# Patient Record
Sex: MALE | Race: WHITE | Employment: OTHER | URBAN - METROPOLITAN AREA
[De-identification: names, ages, dates, MRNs, and addresses within clinical notes are randomized per-mention and may not be internally consistent; named-entity substitution may affect disease eponyms.]

---

## 2017-05-12 PROBLEM — G80.2 SPASTIC HEMIPLEGIC CEREBRAL PALSY (HCC): Status: ACTIVE | Noted: 2017-05-12

## 2018-03-15 ENCOUNTER — HOSPITAL ENCOUNTER (OUTPATIENT)
Dept: OTHER | Age: 28
Discharge: HOME | End: 2018-03-15
Attending: INTERNAL MEDICINE | Admitting: INTERNAL MEDICINE

## 2018-03-15 LAB
A/G RATIO: 1.8 (ref 1.1–2.2)
ALBUMIN SERPL-MCNC: 4.9 G/DL (ref 3.4–5)
ALP BLD-CCNC: 67 U/L (ref 40–129)
ALT SERPL-CCNC: 59 U/L (ref 10–40)
ANION GAP SERPL CALCULATED.3IONS-SCNC: 17 MMOL/L (ref 3–16)
AST SERPL-CCNC: 34 U/L (ref 15–37)
BILIRUB SERPL-MCNC: 0.6 MG/DL (ref 0–1)
BUN BLDV-MCNC: 15 MG/DL (ref 7–20)
CALCIUM SERPL-MCNC: 9.2 MG/DL (ref 8.3–10.6)
CHLORIDE BLD-SCNC: 96 MMOL/L (ref 99–110)
CO2: 23 MMOL/L (ref 21–32)
CREAT SERPL-MCNC: 0.7 MG/DL (ref 0.9–1.3)
FERRITIN: 116.4 NG/ML (ref 30–400)
GFR AFRICAN AMERICAN: >60
GFR NON-AFRICAN AMERICAN: >60
GLOBULIN: 2.8 G/DL
GLUCOSE BLD-MCNC: 82 MG/DL (ref 70–99)
HCT VFR BLD CALC: 53.2 % (ref 40.5–52.5)
HEMOGLOBIN: 18.5 G/DL (ref 13.5–17.5)
IRON SATURATION: 33 % (ref 20–50)
IRON: 113 UG/DL (ref 59–158)
MCH RBC QN AUTO: 32.6 PG (ref 26–34)
MCHC RBC AUTO-ENTMCNC: 34.8 G/DL (ref 31–36)
MCV RBC AUTO: 93.9 FL (ref 80–100)
PDW BLD-RTO: 13 % (ref 12.4–15.4)
PLATELET # BLD: 157 K/UL (ref 135–450)
PMV BLD AUTO: 9.3 FL (ref 5–10.5)
POTASSIUM SERPL-SCNC: 3.8 MMOL/L (ref 3.5–5.1)
RBC # BLD: 5.67 M/UL (ref 4.2–5.9)
SODIUM BLD-SCNC: 136 MMOL/L (ref 136–145)
TOTAL IRON BINDING CAPACITY: 342 UG/DL (ref 260–445)
TOTAL PROTEIN: 7.7 G/DL (ref 6.4–8.2)
WBC # BLD: 7.7 K/UL (ref 4–11)

## 2018-12-07 ENCOUNTER — HOSPITAL ENCOUNTER (EMERGENCY)
Age: 28
Discharge: HOME | End: 2018-12-07
Payer: MEDICAID

## 2018-12-07 VITALS
SYSTOLIC BLOOD PRESSURE: 125 MMHG | OXYGEN SATURATION: 94 % | HEART RATE: 89 BPM | DIASTOLIC BLOOD PRESSURE: 88 MMHG | TEMPERATURE: 98.1 F | RESPIRATION RATE: 18 BRPM

## 2018-12-07 DIAGNOSIS — J06.9 UPPER RESPIRATORY TRACT INFECTION, UNSPECIFIED TYPE: Primary | ICD-10-CM

## 2018-12-07 LAB
RAPID INFLUENZA  B AGN: NEGATIVE
RAPID INFLUENZA A AGN: NEGATIVE
S PYO AG THROAT QL: NEGATIVE

## 2018-12-07 PROCEDURE — 87880 STREP A ASSAY W/OPTIC: CPT

## 2018-12-07 PROCEDURE — 87804 INFLUENZA ASSAY W/OPTIC: CPT

## 2018-12-07 PROCEDURE — 87081 CULTURE SCREEN ONLY: CPT

## 2018-12-07 PROCEDURE — 99283 EMERGENCY DEPT VISIT LOW MDM: CPT

## 2018-12-07 PROCEDURE — 6370000000 HC RX 637 (ALT 250 FOR IP): Performed by: NURSE PRACTITIONER

## 2018-12-07 RX ORDER — ACETAMINOPHEN 500 MG
1000 TABLET ORAL ONCE
Status: COMPLETED | OUTPATIENT
Start: 2018-12-07 | End: 2018-12-07

## 2018-12-07 RX ADMIN — ACETAMINOPHEN 1000 MG: 500 TABLET, FILM COATED ORAL at 10:03

## 2018-12-07 ASSESSMENT — ENCOUNTER SYMPTOMS
VOMITING: 0
SHORTNESS OF BREATH: 0
CHEST TIGHTNESS: 0
SORE THROAT: 1
ABDOMINAL PAIN: 0
RHINORRHEA: 1
DIARRHEA: 0
NAUSEA: 0

## 2018-12-07 ASSESSMENT — PAIN SCALES - GENERAL: PAINLEVEL_OUTOF10: 0

## 2018-12-07 NOTE — ED PROVIDER NOTES
[address] [facility]  eMERGENCY dEPARTMENT eNCOUnter        Pt Name: Veronica Rivera  MRN: [id number]  Stephanietrongfquan 1990  Date of evaluation: 12/7/2018  Provider: LESA Andersen - MANFRED  PCP: Nelia Vargas MD    This patient was not seen and evaluated by the attending physician No att. providers found. CHIEF COMPLAINT       Chief Complaint   Patient presents with    Illness     runny nose, cough, 1x diarrhea       HISTORY OF PRESENT ILLNESS   (Location/Symptom, Timing/Onset, Context/Setting, Quality, Duration, Modifying Factors, Severity)  Note limiting factors. Veronica Rivera is a 33 y.o. male with concern of runny nose, sore throat. Reports symptom onset this morning. Reports mild relief with throat lozenges. No mitigating or exacerbating factor. Denies any recent travel or sick exposure. Denies any headache, fever, lightheadedness, dizziness, visual disturbances. No chest pain or pressure. No neck or back pain. No shortness of breath, cough, or congestion. No abdominal pain, nausea, vomiting, diarrhea, constipation, or dysuria. No rash. Nursing Notes were all reviewed and agreed with or any disagreements were addressed  in the HPI. REVIEW OF SYSTEMS    (2-9 systems for level 4, 10 or more for level 5)     Review of Systems   Constitutional: Negative for activity change, chills and fever. HENT: Positive for rhinorrhea and sore throat. Respiratory: Negative for chest tightness and shortness of breath. Cardiovascular: Negative for chest pain. Gastrointestinal: Negative for abdominal pain, diarrhea, nausea and vomiting. Genitourinary: Negative for dysuria. All other systems reviewed and are negative. Positives and Pertinent negatives as per HPI. Except as noted abovein the ROS, all other systems were reviewed and negative.        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    Ventricular septal defect          SURGICAL HISTORY     Past Surgical History:   Procedure Laterality Date    JOINT REPLACEMENT           CURRENTMEDICATIONS       Discharge Medication List as of 12/7/2018 10:49 AM      CONTINUE these medications which have NOT CHANGED    Details   loratadine (CLARITIN) 10 MG tablet Take 1 tablet by mouth daily, Disp-30 tablet, R-5Normal      zonisamide (ZONEGRAN) 100 MG capsule Take 1 capsule by mouth 2 times daily, Disp-180 capsule, R-0Normal      lamoTRIgine (LAMICTAL) 25 MG tablet Take 1 tablet by mouth 2 times daily, Disp-180 tablet, R-0Normal      digoxin (LANOXIN) 125 MCG tablet Take 1 tablet by mouth daily, Disp-90 tablet, R-0Normal      fluticasone (FLONASE) 50 MCG/ACT nasal spray 1 spray by Nasal route daily, Disp-1 Bottle, R-3Normal      furosemide (LASIX) 20 MG tablet Take 1 tablet by mouth daily, Disp-90 tablet, R-0Normal      docusate sodium (COLACE) 100 MG capsule Take 1 capsule by mouth 2 times daily, Disp-60 capsule, R-2Normal      losartan (COZAAR) 25 MG tablet Take 1 tablet by mouth daily, Disp-30 tablet, R-3Normal      warfarin (COUMADIN) 7.5 MG tablet Take 7.5 mg by mouthHistorical Med               ALLERGIES     Klonopin [clonazepam]    FAMILYHISTORY     History reviewed. No pertinent family history.        SOCIAL HISTORY       Social History     Social History    Marital status: Single     Spouse name: N/A    Number of children: N/A    Years of education: N/A     Social History Main Topics    Smoking status: Never Smoker    Smokeless tobacco: Never Used    Alcohol use No    Drug use: No    Sexual activity: No     Other Topics Concern    None     Social History Narrative    None       SCREENINGS             PHYSICAL EXAM    (up to 7 for level 4, 8 or more for level 5)     ED as CT, Ultrasound and MRI are read by the radiologist. Plain radiographic images are visualized andpreliminarily interpreted by the  ED Provider with the below findings:        Interpretation [facility] Radiologist below, if available at the time of this note:    No orders to display     No results found. PROCEDURES   Unless otherwise noted below, none     Procedures    CRITICAL CARE TIME   N/A    CONSULTS:  None      EMERGENCY DEPARTMENT COURSE and DIFFERENTIALDIAGNOSIS/MDM:   Vitals:    Vitals:    12/07/18 0933   BP: 125/88   Pulse: 89   Resp: 18   Temp: 98.1 °F (36.7 °C)   TempSrc: Infrared   SpO2: 94%       Patient was given thefollowing medications:  Medications   acetaminophen (TYLENOL) tablet 1,000 mg (1,000 mg Oral Given 12/7/18 1003)       Briefly, this is a 29-year-old male that presents to the emergency department with concern of runny nose, sore throat. Reports symptom onset this morning. Reports mild relief with throat lozenges. No mitigating or exacerbating factor. Denies any recent travel or sick exposure. Symptoms are likely viral in nature. He is given Tylenol. Instructed to follow up with primary care, return for new or worsening symptoms. I estimate there is LOW risk for PNEUMONIA, MENINGITIS, PERITONSILLAR ABSCESS, SEPSIS, MALIGNANT OTITIS EXTERNA, OR EPIGLOTTITIS thus I consider the discharge disposition reasonable. Advised to follow-up with family doctor within the next 24-48 hours and return to the emergency department with any concerns. FINAL IMPRESSION      1.  Upper respiratory tract infection, unspecified type          DISPOSITION/PLAN   DISPOSITION Decision To Discharge 12/07/2018 10:22:28 AM      PATIENT REFERREDTO:  Sunny Lopez MD  [address]  [phone]    Schedule an appointment as soon as possible for a visit       [facility] Emergency Department  [address] [place] [address]  [phone]    If symptoms

## 2018-12-09 LAB — S PYO THROAT QL CULT: NORMAL

## 2018-12-18 LAB
POC INR: 2.2 (ref 0.8–1.2)
POC PT: 25.6
POC SOURCE: ABNORMAL
TECH: ABNORMAL

## 2019-01-17 LAB
POC INR: 2.1 (ref 0.8–1.2)
POC PT: 24.8
POC SOURCE: ABNORMAL
TECH: ABNORMAL

## 2019-06-25 DIAGNOSIS — D75.1 POLYCYTHEMIA: ICD-10-CM

## 2019-06-25 LAB
HCT VFR BLD CALC: 52.5 % (ref 40.5–52.5)
HEMOGLOBIN: 17.9 G/DL (ref 13.5–17.5)
MCH RBC QN AUTO: 32.7 PG (ref 26–34)
MCHC RBC AUTO-ENTMCNC: 34.1 G/DL (ref 31–36)
MCV RBC AUTO: 95.7 FL (ref 80–100)
PDW BLD-RTO: 13 % (ref 12.4–15.4)
PLATELET # BLD: 201 K/UL (ref 135–450)
PMV BLD AUTO: 9.3 FL (ref 5–10.5)
RBC # BLD: 5.49 M/UL (ref 4.2–5.9)
WBC # BLD: 7.9 K/UL (ref 4–11)

## 2019-06-26 LAB
A/G RATIO: 1.8 (ref 1.1–2.2)
ALBUMIN SERPL-MCNC: 4.8 G/DL (ref 3.4–5)
ALP BLD-CCNC: 66 U/L (ref 40–129)
ALT SERPL-CCNC: 43 U/L (ref 10–40)
ANION GAP SERPL CALCULATED.3IONS-SCNC: 15 MMOL/L (ref 3–16)
AST SERPL-CCNC: 27 U/L (ref 15–37)
BILIRUB SERPL-MCNC: 0.6 MG/DL (ref 0–1)
BUN BLDV-MCNC: 13 MG/DL (ref 7–20)
CALCIUM SERPL-MCNC: 9.4 MG/DL (ref 8.3–10.6)
CHLORIDE BLD-SCNC: 106 MMOL/L (ref 99–110)
CO2: 18 MMOL/L (ref 21–32)
CREAT SERPL-MCNC: 0.8 MG/DL (ref 0.9–1.3)
GFR AFRICAN AMERICAN: >60
GFR NON-AFRICAN AMERICAN: >60
GLOBULIN: 2.6 G/DL
GLUCOSE BLD-MCNC: 86 MG/DL (ref 70–99)
IRON SATURATION: 35 % (ref 20–50)
IRON: 104 UG/DL (ref 59–158)
POTASSIUM SERPL-SCNC: 4.3 MMOL/L (ref 3.5–5.1)
SODIUM BLD-SCNC: 139 MMOL/L (ref 136–145)
TOTAL IRON BINDING CAPACITY: 297 UG/DL (ref 260–445)
TOTAL PROTEIN: 7.4 G/DL (ref 6.4–8.2)

## 2019-09-19 ENCOUNTER — HOSPITAL ENCOUNTER (EMERGENCY)
Age: 29
Discharge: HOME | End: 2019-09-19
Payer: MEDICAID

## 2019-09-19 VITALS
HEART RATE: 83 BPM | RESPIRATION RATE: 16 BRPM | WEIGHT: 120 LBS | DIASTOLIC BLOOD PRESSURE: 77 MMHG | HEIGHT: 68 IN | BODY MASS INDEX: 18.19 KG/M2 | OXYGEN SATURATION: 96 % | TEMPERATURE: 97.5 F | SYSTOLIC BLOOD PRESSURE: 128 MMHG

## 2019-09-19 DIAGNOSIS — J34.89 RHINORRHEA: Primary | ICD-10-CM

## 2019-09-19 PROCEDURE — 99282 EMERGENCY DEPT VISIT SF MDM: CPT

## 2019-09-19 ASSESSMENT — ENCOUNTER SYMPTOMS
DIARRHEA: 0
CONSTIPATION: 0
BACK PAIN: 0
ABDOMINAL PAIN: 0
NAUSEA: 0
VOMITING: 0
SHORTNESS OF BREATH: 0
RHINORRHEA: 1

## 2019-09-19 NOTE — ED PROVIDER NOTES
(LAMICTAL) 25 MG TABLET    Take 1 tablet by mouth 2 times daily    LORATADINE (CLARITIN) 10 MG TABLET    Take 1 tablet by mouth daily    LOSARTAN (COZAAR) 25 MG TABLET    Take 1 tablet by mouth daily    WARFARIN (COUMADIN) 7.5 MG TABLET    Take 7.5 mg by mouth    ZONISAMIDE (ZONEGRAN) 100 MG CAPSULE    Take 1 capsule by mouth 2 times daily         Review of Systems:  Review of Systems   Constitutional: Negative for chills and fever. HENT: Positive for rhinorrhea. Respiratory: Negative for shortness of breath. Cardiovascular: Negative for chest pain. Gastrointestinal: Negative for abdominal pain, constipation, diarrhea, nausea and vomiting. Musculoskeletal: Negative for back pain. All other systems reviewed and are negative. Positives and Pertinent negatives as per HPI. Except as noted above in the ROS, all other systems were reviewed/completed and are negative. Physical Exam:  Physical Exam   Constitutional: He is oriented to person, place, and time. He appears well-developed and well-nourished. He is active and cooperative. Non-toxic appearance. HENT:   Head: Normocephalic. Right Ear: External ear normal.   Left Ear: External ear normal.   Nose: Nose normal.   Mouth/Throat: Oropharynx is clear and moist. No oropharyngeal exudate. Eyes: Conjunctivae are normal. Right eye exhibits no discharge. Left eye exhibits no discharge. Neck: Normal range of motion. Neck supple. Cardiovascular: Normal rate, regular rhythm and normal heart sounds. Exam reveals no gallop and no friction rub. No murmur heard. Pulmonary/Chest: Effort normal and breath sounds normal. No stridor. No respiratory distress. He has no wheezes. He has no rales. Musculoskeletal: Normal range of motion. Neurological: He is alert and oriented to person, place, and time. Skin: Skin is warm and dry. He is not diaphoretic. No pallor. Psychiatric: He has a normal mood and affect.  His behavior is normal.   Nursing note time, and the patient is in stable condition. The patient acknowledged understanding is agreeable with this plan. CLINICAL IMPRESSION:  1.  Rhinorrhea        DISPOSITION Decision To Discharge 09/19/2019 07:57:15 PM      PATIENT REFERRED TO:  Manuel Alvarez MD  [employer] [employer] [facility] [employer]  [phone]    Schedule an appointment as soon as possible for a visit       [facility]  [address] [place] [address]  [phone]  Go to   If symptoms worsen      DISCHARGE MEDICATIONS:  New Prescriptions    No medications on file              (Please note the MDM and HPI sections of this note were completed with avoice recognition program.  Efforts were made to edit the dictations but occasionally words are mis-transcribed.)    Electronically signed, GLYNN Geiger,             GLYNN Gomez  09/19/19 [id number]

## 2019-09-20 NOTE — ED NOTES
Pt Discharged in stable condition, VSS, no signs of distress, discharge instructions reviewed. Pt verbalizes understanding and states no further questions or concerns unaddressed.        Alisha Edgar RN  09/19/19 2007

## 2019-09-26 LAB
POC INR: 2.5 (ref 0.8–1.2)
POC PT: 28.4
POC SOURCE: ABNORMAL
TECH: ABNORMAL

## 2019-10-23 LAB
POC INR: 2.3 (ref 0.8–1.2)
POC PT: 26.5
POC SOURCE: ABNORMAL
TECH: ABNORMAL

## 2022-12-07 ENCOUNTER — HOSPITAL ENCOUNTER (EMERGENCY)
Age: 32
Discharge: HOME | End: 2022-12-07
Payer: MEDICAID

## 2022-12-07 ENCOUNTER — HOSPITAL ENCOUNTER (OUTPATIENT)
Dept: PHYSICAL THERAPY | Age: 32
Setting detail: THERAPIES SERIES
Discharge: HOME | End: 2022-12-07
Payer: MEDICAID

## 2022-12-07 ENCOUNTER — APPOINTMENT (OUTPATIENT)
Dept: GENERAL RADIOLOGY | Age: 32
End: 2022-12-07
Payer: MEDICAID

## 2022-12-07 VITALS
OXYGEN SATURATION: 92 % | BODY MASS INDEX: 28.25 KG/M2 | DIASTOLIC BLOOD PRESSURE: 70 MMHG | HEART RATE: 87 BPM | WEIGHT: 180 LBS | RESPIRATION RATE: 18 BRPM | SYSTOLIC BLOOD PRESSURE: 131 MMHG | TEMPERATURE: 98 F | HEIGHT: 67 IN

## 2022-12-07 DIAGNOSIS — T14.8XXA AVULSION FRACTURE: Primary | ICD-10-CM

## 2022-12-07 PROCEDURE — 73630 X-RAY EXAM OF FOOT: CPT

## 2022-12-07 PROCEDURE — 97163 PT EVAL HIGH COMPLEX 45 MIN: CPT

## 2022-12-07 PROCEDURE — 6370000000 HC RX 637 (ALT 250 FOR IP): Performed by: NURSE PRACTITIONER

## 2022-12-07 PROCEDURE — 97110 THERAPEUTIC EXERCISES: CPT

## 2022-12-07 RX ORDER — ACETAMINOPHEN 500 MG
1000 TABLET ORAL ONCE
Status: COMPLETED | OUTPATIENT
Start: 2022-12-07 | End: 2022-12-07

## 2022-12-07 RX ADMIN — ACETAMINOPHEN 1000 MG: 500 TABLET ORAL at 20:56

## 2022-12-07 ASSESSMENT — ENCOUNTER SYMPTOMS
NAUSEA: 0
SHORTNESS OF BREATH: 0
CHEST TIGHTNESS: 0
ABDOMINAL PAIN: 0
COLOR CHANGE: 1
VOMITING: 0
DIARRHEA: 0

## 2022-12-07 ASSESSMENT — PAIN SCALES - GENERAL
PAINLEVEL_OUTOF10: 4
PAINLEVEL_OUTOF10: 4

## 2022-12-07 ASSESSMENT — PAIN - FUNCTIONAL ASSESSMENT: PAIN_FUNCTIONAL_ASSESSMENT: 0-10

## 2022-12-07 NOTE — PLAN OF CARE
[facility], [facility], [address]  Phone: [phone]   Fax: [phone]     Physical Therapy Certification    Dear Bren Ochoa MD  ,    We had the pleasure of evaluating the following patient for physical therapy services at [address] [place] [address]. A summary of our findings can be found in the initial assessment below. This includes our plan of care. If you have any questions or concerns regarding these findings, please do not hesitate to contact me at the office phone number checked above. Thank you for the referral.       Physician Signature:_______________________________Date:__________________  By signing above (or electronic signature), therapists plan is approved by physician      Patient: Mancil Page   : 1990   MRN: [id number]  Referring Physician: Bren Ochoa MD        Evaluation Date: 2022      Medical Diagnosis Information:  Hemiplegia, unspecified affecting right dominant side [G81.91]   PT diagnosis:  BUE and BLE weakness, impaired trunk control and decrease unsupported sitting and standing tolerance. Insurance information: PT Insurance Information: Medicaid (30 combined pcy, [employer], no auth)    Plan of care sent to provider:      []Faxed   []Co-signature    (attempts: 1[x] 22 2[] 3[])          Precautions/ Contra-indications:   Adhesive allergy:  [x]NO      []YES   Latex Allergy:   [x]NO      []YES     Preferred Language for Healthcare:   [x]English       []other:    C-SSRS Triggered by Intake questionnaire (Past 2 wk assessment ):   [x] No, Questionnaire did not trigger screening.   [] Yes, Patient intake triggered C-SSRS Screening     [] Completed, no further action required.    [] Completed, PCP notified via Epic      Functional Scale: Date assessed:  [facility] physical FS primary measure score = NA; risk adjusted = NA                  NA    Relevant Medical History:   per pt chart: Congenital heart defect, CVA (22 mo old), mod MR, hx of seizures, spastic hemiplegic CP, R THR, ventricular septal defect, tetrology of fallot    SUBJECTIVE:     History of current complaint: Reports having having R foot pain from sleeping on it wrong last night and wants to come to physical therapy to build up his strength. He lives a group home, and came to appointment with a staff member who stayed in the waiting room. Reports that he is able to dress, shower and eat independently. Primarily uses his manual wheelchair to ambulate and doesn't use foot rest because it is easier to move around without. Got his new wheel chair about 4 months ago. Reports that he has trouble getting in and out of wheelchair. Report having PT in the past, but not here. Denies wearing any bracing and denies falls. Discussed with staff member who brought pt to appointment about foot at end of session and staff member stated today was first day pt complained of foot- discussed to continue to monitor swelling and report to PCP if does not improve by end of week - staff verbalized understanding. Pain Scale: unable to rate/10  Easing factors: NA  Provocative factors: NA   Type: []Constant   []Intermittent  []Radiating []Localized []other:  Numbness/Tingling: NA  Pt. reports bowel and bladder changes:   []yes   []no     Baseline function/PLOF:  History of falls      []yes   [x]no   []unknown  Pt able to drive      []yes   [x]no   []unknown  Pt independent with ADLs   [x]yes   []no   []unknown  Pt required assistance from family for:      []bathing    []cooking    []cleaning    []dressing    []laundry     []unknown  Reports being able to perform all ADL independently , but does not drive.    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      Social support/Living environment:      Family/caregiver support:   [x] yes:   []no  Equipment owned:  []SPC    []standard walker (SW)   []rolling walker (RW)  []rollator   []hemiwalker   []crutches   []loftstrands    []large based quad cane (LBQC)  []small based quad cane (SBQC)  [x]manual wc    [] electric wc    []lift chair     []hospital bed     []home o2    L      []reacher     []life alert     []none    []unknown  []other:   Home environment:   []apartment    []1 story      []2 story     [] in [facility] facility   [] split level home    [] mobile home    []senior apartment     [] unknown  [x] group home     Steps to enter first floor:    [] N/A    []no steps    []     steps to enter   []no handrail    []single HR     []bilateral HR   [x]ramp    [] unknown     Steps to second floor:  [x] N/A    []     steps    []full flight 12-13   []no handrail     [] single HR     [] bilateral HR   [] unknown     Bathroom: []tub shower    [] walk-in shower   [x]grab bars    []shower chair   []tub shower bench   []raised toilet seat w/arms  []raised toilet seat w/o arms   []BSC (3 in 1)   [x]reports sitting on floor to shower and can transfer from floor to wheelchair           OBJECTIVE:     Cognitive Status:    A&O to  []x4    [x]person    [x]place    []time    []situation    [x]orientation not directly assessed    []not oriented   Able to follow:   []1 step commands    [x]1 step command inconsistently     []2 step commands       []multi-step commands     Vision:    []WNL    [x]wears glasses for reading    [x]wears glasses for sight    []Homonymous hemianopsia  []diplopia    []other:   Hearing:   [x]WNL    []wears hearing aides    []impaired     []tinnitus      Communication      []WNL    [x]slurred speech    []expressive aphasia    []receptive aphasia     []impaired:  Comments:       Functional Mobility    Bed Mobility:  Mod I - inc time   Transfers: can perform lateral scoot from manual w/c to bed and back  Rolling to right side: Mod I   Sit to stand:  Max assistance  Rolling to left side:     Mod I  Stand to sit:  Max assistance  Scooting in bed:    Mod I Stand pivot:  NA  Supine to sit:     Mod I  Bed to chair:  NA  Sit to supine:  mod I      Gait Testing:   NA  Gait   Level of Assistance needed:      Assistive Device Used:    Gait Deviations (firm surface/linoleum):      Stairs  Level of Assistance needed:      Pt negotiated up/down 4 stairs:   []WNL with 0-1 HR, reciprocal pattern, no falter     []reciprocal    []step to pattern:       []no HR    []1 HR    [] B HR   Assistive Device Used:       Deficits noted:          Balance:   Static Sitting:  []normal  []good  []good-  []fair+  [x]fair   []fair -   []poor  Dynamic Sitting: []normal  []good  []good-  []fair+  []fair   [x]fair -   []poor  Static Standing:  []normal  []good  []good-  []fair+  []fair   []fair -   []poor  Dynamic Standing: []normal  []good  []good-  []fair+  []fair   []fair -   []poor    Balance/Special Tests/Outcome Measures: Result N/A Comments   SLS      Feet Together      Romberg         Tandem Stance      QOLIBRI      TUG Score      10-meter Walk      30 sec Sit to Stand      Mini-BESTest      Dynamic Gait Index      Functional Gait Assessment      Tinetti Assessment      FIST 25/56     BENEDICT Balance Assessment 3, 80-99%             Coordination Testing:       Finger to Nose:        []WNL  []Impaired   Alternating pronation/supination:   []WNL  []Impaired   Finger/Thumb opposition:  []WNL  []Impaired   Heel to shin (sitting or supine):      []WNL  []Impaired   Alternating Toe Tapping:       []WNL  []Impaired     Flexibility     Hip flexion/Gallo test:    []decreased R  []decreased L  Hamstrings:    [x]decreased R  [x]decreased L  Gastrocs:     [x]decreased R  [x]decreased L  Obers/TFL/ITB:    []decreased R  []decreased L  Piriformis:    []decreased R  []decreased L  Other: prone knee bend pos B for hip flexor tightness    Tone   RUE:  []WNL []Hypertonia []Hypotonia []Tremoring []Spastic []Clonus []Flaccid  LUE:  []WNL  []Hypertonia []Hypotonia []Tremoring []Spastic []Clonus []Flaccid  RLE:  []WNL  []Hypertonia []Hypotonia []Tremoring []Spastic [x]Clonus []Flaccid  LLE:   []WNL  []Hypertonia []Hypotonia []Tremoring []Spastic [x]Clonus []Flaccid      Sensation: NA  Light touch:    []WNL    []Diminished    []Impaired   []Absent Location:   [] RUE    []LUE         []RLE     []LLE     Comment:    Proprioception:    []WNL    []Diminished    []Impaired   []Absent Location:   [] RUE    []LUE         []RLE     []LLE     Comment:      Reflexes  Reflex Left Right   Biceps (C5,6)     Brachioradialis (C6)     Triceps (C7)     Quadriceps (L3,4)     Achilles (S1,2)     Ankle clonus x x   Amairani's reflex      Babinski's reflex        ROM  BUE WFL for shoulder, and elbow  R UE contraction of wrist in flexion noted    Inspection:  TTP on Lateral side of R foot, blanchable redness, with refill in <3 seconds. Swelling and warmth noted on lateral side of foot. Dec trunk control. Req assistance with sitting on EOM to stayupright d.t weak trunk  Supine laying: RLE knee valgus  LLE hyptonia and hypertonia noted.     MMT LEFT RIGHT Comments   Neck flexion (C1-C2)      Neck sidebending (C3)      Shoulder flexion/elevation (C4) 3 3    Shoulder abduction (C5) 3 3    Shoulder ER      Shoulder IR      Elbow flexion/wrist extension (C6) 3 3    Elbow extension/wrist flexion (C7) 3 3    Thumb abduction (C8)      Finger abduction (T1)            Hip flexion (L1-L2) 3+ 3    Hip abduction      Hip extension 2+ 2+    Knee extension (L2-L4) 3+ 3    Knee flexion 3+ 3    Ankle Dorsiflexion (L4-L5) 4 3    Ankle Plantar flexion (S1-S2) 4+  Unable to assess on R d/t pt guarding d.t pain   Ankle Eversion (S1-S2) 3+  Unable to assess on R d/t pt guarding d/t pain   Great Toe Ext (L5)            Core Strength [x] Patient history, allergies, meds reviewed. Medical chart reviewed. See intake form. Review Of Systems (ROS):  [x]Performed Review of systems (Integumentary, CardioPulmonary, Neurological) by intake and observation. Intake form has been scanned into medical record. Patient has been instructed to contact their primary care physician regarding ROS issues if not already being addressed at this time.       Co-morbidities/Complexities (which will affect course of rehabilitation):   []None        []Hx of COVID   Arthritic conditions   []Rheumatoid arthritis (M05.9)  []Osteoarthritis (M19.91)  []Gout   Cardiovascular conditions   []Hypertension (I10)  []Hyperlipidemia (E78.5)  []Angina pectoris (I20)  []Atherosclerosis (I70)  []Pacemaker  []Hx of CABG/stent/  cardiac surgeries   Musculoskeletal conditions   []Disc pathology   []Congenital spine pathologies   []Osteoporosis (M81.8)  []Osteopenia (M85.8)  []Scoliosis       Endocrine conditions   []Hypothyroid (E03.9)  []Hyperthyroid Gastrointestinal conditions   []Constipation (R68.20)   Metabolic conditions   []Morbid obesity (E66.01)  []Diabetes type 1(E10.65) or 2 (E11.65)   []Neuropathy (G60.9)     Cardio/Pulmonary conditions   []Asthma (J45)  []Coughing   []COPD (J44.9)  []CHF  []A-fib   Psychological Disorders  []Anxiety (F41.9)  []Depression (F32.9)   []Other:   Developmental Disorders  []Autism (F84.0)  []CP (G80)  []Down Syndrome (Q90.9)  []Developmental delay     Neurological conditions  []Prior Stroke (I69.30)  []Parkinson's (G20)  []Encephalopathy (G93.40)  []MS (G35)  []Post-polio (G14)  []SCI  []TBI  []ALS Other conditions  []Fibromyalgia (M79.7)  []Vertigo  []Syncope  []Kidney Failure  []Cancer      []currently undergoing                treatment  []Pregnancy  []Incontinence   Prior surgeries  []involved limb  []previous spinal surgery  [] section birth  []hysterectomy  []bowel / bladder surgery  []other relevant surgeries   [x]Other: congenital heart defect, CVA, MR, Seizure, spastic hemiplegic cerebral palsy, R THR, ventricular septal defect             Barriers to/and or personal factors that will affect rehab potential:              [x]Age  [x]Sex    []Smoker              [x]Motivation/Lack of Motivation                        [x]Co-Morbidities              [x]Cognitive Function, education/learning barriers              [x]Environmental, home barriers              []profession/work barriers  [x]past PT/medical experience  []other: Pt has fair potential for rehab but has barriers affecting potential including co-morbidities. Falls Risk Assessment (30 days):   [] Falls Risk assessed and no intervention required. [x] Falls Risk assessed and Patient requires intervention due to being higher risk   []Tinetti Balance: Total Score:        Balance:            Gait:         Disability Index:       Risk of Falls:   []Low (> 24)   []Mod (19-23)   []High (< 18)    []Dynamic Gait Index       Risk of Falls: <19 = increased fall risk    []Functional Gait Assessment           Indications: Non-specific older adults: <22/30 = risk of falls; Parkinson's patients <15-18/30 = risk of falls    [x]James Balance Scale          []41-56 = independent     []21-40 = walking with assistance     [x]0-20 = wc bound    []FIST: 25/56  Timed Up and Go (TUG)     seconds     []<12 = Independent    []<20 = Fall risk    []20-29  = High fall risk    []>30 = Severe fall risk  [x] Falls education provided, including: ensuring wheelchair brakes are locked when transferring to/from wheelchair. ASSESSMENT: Gaston Neri is a 33 y.o. male presents to OP physical therapy with signs and symptoms consistent of  R hemiplegia. Pt demonstrates BUE and BLE weakness, impaired trunk control and decrease unsupported sitting and standing tolerance. Pt's deficits impacts their quality of life and safety at home and in the community.  Pt would benefit from skilled PT to address impairments above, provide patient education on home safety and proper use of AD and proper biomechanics, and improve quality of life. Functional Impairments:     []Noted scapular/hip hypomobility   []Noted scapular/hip hypermobility   [x]Assistance required with functional mobility/gait for safety   [x]Decreased core/proximal hip strength and neuromuscular control    [x]Decreased UE/LE functional strength    []Abnormal reflexes/sensation/myotomal/dermatomal deficits  []Hypertonic UE/LE   []Hypotonic UE/LE  []Dislocated//Hypermobile Glenohumeral joint  [x]Impaired balance/coordination/proprioceptive control    []other:      Functional Activity Limitations (from functional questionnaire and intake)   [x]Reduced ability to tolerate prolonged functional positions   [x]Reduced ability or difficulty with changes of positions or transfers between positions   [x]Reduced ability to maintain good posture and demonstrate good body mechanics with sitting, bending, and lifting   []Reduced ability to sleep   [] Reduced ability or tolerance with driving and/or computer work   [x]Reduced ability to perform lifting, reaching, carrying tasks   []Reduced ability to squat   []Reduced ability to forward bend   [x]Reduced ability to ambulate prolonged functional periods/distances/surfaces   []Reduced ability to ascend/descend stairs   []other:       Participation Restrictions   []Reduced participation in self care activities   [x]Reduced participation in home management activities   []Reduced participation in work activities   []Reduced participation in social activities. []Reduced participation in sport/recreational activities.     Classification:   []Signs/symptoms consistent with Primary prevention/risk reduction for loss of balance and falls    [x]Signs/symptoms consistent with Impaired neuromotor development   []Signs/symptoms consistent with Impaired motor function and sensory integrity associated Activation of scapular and hip musculature, Transfer & Gait safety, Evenly distributed weight-bearing through extremities, Pain Management    Frequency/Duration:  2 days per week for 8 Weeks:  Interventions:  [x]  Therapeutic exercise including: strength training, ROM, for LE, Glutes and core   [x]  NMR activation and proprioception for shoulder complex, glutes, UE/LE, and Core   [x]  Manual therapy as indicated for Shoulder & Hip complex, LE and core activation to include: STM, manual cues to facilitate/inhibit muscle groups. [x]  Modalities as needed that may include: thermal agents, E-stim, Biofeedback, US as indicated  [x]  Patient education on joint protection, postural re-education, home/activity modification, progression of HEP. HEP instruction: Written HEP instructions provided and reviewed. GOALS:  Patient stated goal: Wanting to build up strength to walk and ride bicycle and lift weights  [] Progressing: [] Met: [] Not Met: [] Adjusted    Therapist goals for Patient:   Short Term Goals: To be achieved in: 2 weeks  1. Independent in HEP and progression per patient tolerance, in order to prevent re-injury. [] Progressing: [] Met: [] Not Met: [] Adjusted  2. Patient will have a decrease in pain to facilitate improvement in movement, function, and ADLs as indicated by Functional Deficits. [] Progressing: [] Met: [] Not Met: [] Adjusted    Long Term Goals: To be achieved in: 8 weeks  1. Pt will demonstrate an increase in BENEDICT Balance by 9 points (MCID) to improve static balance and decrease fall risk. [] Progressing: [] Met: [] Not Met: [] Adjusted  2. Patient will demonstrate and improvement in FIST score >6 (MCID) to demonstrate improvements in sitting balance and safety at home and in the community. [] Progressing: [] Met: [] Not Met: [] Adjusted  3.  Patient will stand for at least 5 minutes with good upright posture to demonstrate improvements in standing balance and endurance needed to perform ADLs and improve quality of life. [] Progressing: [] Met: [] Not Met: [] Adjusted  4. Pt will demonstrate an increase in strength to 4/5 in BUE and BLE to demonstrate good shoulder & hip complex, and core activation to allow for proper functional mobility as indicated by patients Functional Deficits.    [] Progressing: [] Met: [] Not Met: [] Adjusted      Electronically signed by:  Patricio Toledo, PT, DPT

## 2022-12-07 NOTE — FLOWSHEET NOTE
[facility] Physical Therapy  Phone: [phone]   Fax: [phone]    Physical Therapy Daily Treatment Note    Date:  2022     Patient Name:  Kaden Cruz    :  1990  MRN: [id number]  Medical Diagnosis:  Hemiplegia, unspecified affecting right dominant side [G81.91]  Treatment Diagnosis: BUE and BLE weakness, impaired trunk control and decrease unsupported sitting and standing tolerance. Insurance/Certification information:  PT Insurance Information: Medicaid (30 combined pcy, HARD [employer], no auth)  Physician Information:  Almas Wiggins MD    Plan of care signed (Y/N): []  Yes [x]  No     Date of Patient follow up with Physician:      Progress Report: []  Yes  [x]  No     Date Range for reporting period:  Beginnin2022  Ending:       Progress report due (10 Rx/or 30 days whichever is less): visit #8 or      Recertification due (POC duration/ or 90 days whichever is less): visit #16 or     Visit # Insurance Allowable Auth required?  Date Range    Medicaid (30 combined pcy, HARD [employer], no auth) []  Yes  [x]  No        Latex Allergy:  [x]NO      []YES  Preferred Language for Healthcare:   [x]English       []Other:      Functional Scale:                                                                                                      Date assessed:  FOTO physical FS primary measure score = NA; risk adjusted = NA                  NA d/t time      Functional Scale/Test Evaluation 2022 30 day  60 day  Discharge    Tinetti Assessment       FIST 25/56       TUG       BENEDICT 3            Pain level:  NA/10  Location:  R foot    SUBJECTIVE:  See eval    OBJECTIVE: See eval      RESTRICTIONS/PRECAUTIONS: Congenital heart defect, CVA (22 mo old), mod MR, hx of seizures, spastic hemiplegic CP, R THR, ventricular septal defect, tetrology of fallot    Interventions/Exercises:   Therapeutic Exercises (32348) Resistance / level Sets/sec Reps Notes Scifit                            Neuromuscular Re-ed (29120)        Supine on wedge crunches       Seated EOM reaching fwd and lateral       Seated EOM thoracic rotations       Seated EOM weightshifting on RLE       Seated trunk pertubations anterior and lateral                             Therapeutic Activities (87583) /  Functional Tasks /       Transfers from floor to mat table and mat table to floor with bolster       Standing tolerance in // bars or by ballet bar       patient educated on diagnosis, prognosis and expectations for rehab, all patient questions were answered. Discussed foot monitoring with staff member at end of session X8 min              Gait Training (96704)                                   Manual Intervention ([id number])       HS stretch  HFS in SL                                              Modalities:     Pt. Education:  12/7/2022 patient educated on diagnosis, prognosis and expectations for rehab, all patient questions were answered    HEP instruction:      Therapeutic Exercise and NMR EXR  [] (43600) Provided verbal/tactile cueing for activities related to strengthening, flexibility, endurance, ROM for improvements in  [] LE / Core stability: LE, hip, and core control with self care, mobility, lifting, ambulation. [] UE / Shoulder complex: cervical, postural, scapular, scapulothoracic and UE control with self care, reaching, carrying, lifting, house/yardwork, driving, computer work.  [] (69563) Provided verbal/tactile cueing for activities related to improving balance, coordination, kinesthetic sense, posture, motor skill, proprioception to assist with   [] LE / Core stability: LE, hip, and core control in self care, mobility, lifting, ambulation and eccentric single leg control.    [] UE / Shoulder complex: cervical, scapular, scapulothoracic and UE control with self care, reaching, carrying, lifting, house/yardwork, driving, computer work.   [] (11624) Therapist is in constant attendance of 2 or more patients providing skilled therapy interventions, but not providing any significant amount of measurable one-on-one time to either patient, for improvements in  [] LE / Core stability: LE, hip, and core control in self care, mobility, lifting, ambulation and eccentric single leg control. [] UE / Shoulder complex: cervical, scapular, scapulothoracic and UE control with self care, reaching, carrying, lifting, house/yardwork, driving, computer work. NMR and Therapeutic Activities:    [x] (37011 or 55954) Provided verbal/tactile cueing for activities related to improving balance, coordination, kinesthetic sense, posture, motor skill, proprioception and motor activation to allow for proper function of   [x] LE / Core, hip and LE with self care and ADLs  [x] UE / Shoulder complex: cervical, postural, scapular, scapulothoracic and UE control with self care, carrying, lifting, driving, computer work.   [] (73561) Gait Re-education- Provided training and instruction to the patient for proper LE, core and hip recruitment, positioning, and eccentric body weight control with ambulation re-education, including ascending & descending stairs     Home Management Training / Self Care:  [] (50406) Provided self-care/home management training related to activities of daily living and compensatory training, and/or use of adaptive equipment for improvement with: ADLs and compensatory training, meal preparation, safety procedures and instruction in use of adaptive equipment, including bathing, grooming, dressing, personal hygiene, basic household cleaning and chores.        Home Exercise Program:    [x] (04801) Reviewed/Progressed HEP activities related to strengthening, flexibility, endurance, ROM of   [] LE / Core stability: core, hip and LE for functional self-care, mobility, lifting and ambulation/stair navigation   [] UE / Shoulder complex: cervical, postural, scapular, scapulothoracic and UE control with self care, reaching, carrying, lifting, house/yardwork, driving, computer work  [] (07385)Reviewed/Progressed HEP activities related to improving balance, coordination, kinesthetic sense, posture, motor skill, proprioception of   [] LE: core, hip and LE for self care, mobility, lifting, and ambulation/stair navigation    [] UE / Shoulder complex: cervical, postural,  scapular, scapulothoracic and UE control with self care, reaching, carrying, lifting, house/yardwork, driving, computer work    Manual Treatments:  PROM / STM / Oscillations-Mobs:  G-I, II, III, IV (PA's, Inf., Post.)  [] (70743) Provided manual therapy to mobilize shoulder complex, hip, LE, and/or cervicothoracic/LS spine soft tissue/joints for the purpose of modulating pain, promoting relaxation,  increasing ROM, reducing/eliminating soft tissue swelling/inflammation/restriction, improving soft tissue extensibility and allowing for proper ROM for normal function with   [] LE / Core stability: self care, mobility, lifting and ambulation. [] UE / Shoulder complex: self care, reaching, carrying, lifting, house/yardwork, driving, computer work. Modalities:  [] (20620) Vasopneumatic compression: Utilized vasopneumatic compression to decrease edema / swelling for the purpose of improving mobility and quad tone / recruitment which will allow for increased overall function including but not limited to self-care, transfers, ambulation, and ascending / descending stairs.          Charges:  Timed Code Treatment Minutes: 8   Total Treatment Minutes: 45     [] EVAL - LOW (54189)   [] EVAL - MOD (28772)  [x] EVAL - HIGH (64496)  [] RE-EVAL (02535)  [] TE (99037) x      [] Ionto  [] NMR (78930) x      [] Vaso  [] Manual (51387) x      [] Ultrasound  [x] TA x   1    [] Mech Traction (73912)  [] Gait Training x     [] ES (un) (83102):   [] Aquatic therapy x   [] Other:   [] Group:     GOALS:  Patient stated goal: Wanting to build up strength to walk and ride bicycle and lift weights  [] Progressing: [] Met: [] Not Met: [] Adjusted     Therapist goals for Patient:   Short Term Goals: To be achieved in: 2 weeks  1. Independent in HEP and progression per patient tolerance, in order to prevent re-injury. [] Progressing: [] Met: [] Not Met: [] Adjusted  2. Patient will have a decrease in pain to facilitate improvement in movement, function, and ADLs as indicated by Functional Deficits. [] Progressing: [] Met: [] Not Met: [] Adjusted     Long Term Goals: To be achieved in: 8 weeks  1. Pt will demonstrate an increase in BENEDICT Balance by 9 points (MCID) to improve static balance and decrease fall risk. [] Progressing: [] Met: [] Not Met: [] Adjusted  2. Patient will demonstrate and improvement in FIST score >6 (MCID) to demonstrate improvements in sitting balance and safety at home and in the community. [] Progressing: [] Met: [] Not Met: [] Adjusted  3. Patient will stand for at least 5 minutes with good upright posture to demonstrate improvements in standing balance and endurance needed to perform ADLs and improve quality of life. [] Progressing: [] Met: [] Not Met: [] Adjusted  4. Pt will demonstrate an increase in strength to 4/5 in BUE and BLE to demonstrate good shoulder & hip complex, and core activation to allow for proper functional mobility as indicated by patients Functional Deficits. [] Progressing: [] Met: [] Not Met: [] Adjusted    Overall Progression Towards Functional goals/ Treatment Progress Update:  [] Patient is progressing as expected towards functional goals listed. [] Progression is slowed due to complexities/Impairments listed. [] Progression has been slowed due to co-morbidities.   [x] Plan just implemented, too soon to assess goals progression <30days   [] Goals require adjustment due to lack of progress  [] Patient is not progressing as expected and requires additional follow up with physician  [] Other    Persisting Functional Limitations/Impairments:  []Sleeping [x]Sitting               [x]Standing [x]Transfers        [x]Walking []Kneeling               []Stairs [x]Squatting / bending   [x]ADLs []Reaching  [x]Lifting  []Housework  []Driving []Job related tasks  []Sports/Recreation []Other:        ASSESSMENT:  See eval    Treatment/Activity Tolerance:  [] Patient able to complete tx [] Patient limited by fatigue  [] Patient limited by pain  [] Patient limited by other medical complications  [] Other:     Prognosis: [] Good [] Fair  [] Poor    Patient Requires Follow-up: [x] Yes  [] No    Plan for next treatment session:    PLAN: See eval. PT 2x / week for 8 weeks. [] Continue per plan of care [] Alter current plan (see comments)  [x] Plan of care initiated [] Hold pending MD visit [] Discharge    Electronically signed by: Riky Chisholm PT, DPT    Note: If patient does not return for scheduled/ recommended follow up visits, his note will serve as a discharge from care along with most recent update on progress.

## 2022-12-08 NOTE — ED PROVIDER NOTES
[address] [facility]        Pt Name: Betsy Zee  MRN: [id number]  [place] 1990  Date of evaluation: 12/7/2022  Provider: LESA Galvin - MANFRED  PCP: Jay Chisholm MD  Note Started: 9:26 PM EST 12/7/22          JOSE CARLOS. I have evaluated this patient. My supervising physician was available for consultation. CHIEF COMPLAINT       Chief Complaint   Patient presents with    Foot Pain     Pt c/o of right foot pain and swelling that started this morning rates pain 4/10 more pain with movement and stands to pivot        HISTORY OF PRESENT ILLNESS   (Location, Timing/Onset, Context/Setting, Quality, Duration, Modifying Factors, Severity, Associated Signs and Symptoms)  Note limiting factors. Chief Complaint: right foot pain and swelling     Betsy Zee is a 33 y.o. male who presents to the emergency department with complaint of right foot pain and swelling that began today after physical therapy. He does report pain over the mid dorsal aspect of the foot. Pain is worse with any movement. He denies injury or trauma but admits that his foot at times gets wrapped beneath him. Nonambulatory. Caregiver at bedside is concerned that the leg was blue-mike in color when the day  alerted her today. The leg/foot is now natural color. Nursing Notes were all reviewed and agreed with or any disagreements were addressed in the HPI. REVIEW OF SYSTEMS    (2-9 systems for level 4, 10 or more for level 5)     Review of Systems   Constitutional:  Negative for activity change, chills and fever. Respiratory:  Negative for chest tightness and shortness of breath. Cardiovascular:  Negative for chest pain. Gastrointestinal:  Negative for abdominal pain, diarrhea, nausea and vomiting. Genitourinary:  Negative for dysuria. Musculoskeletal:  Positive for joint swelling. Skin:  Positive for color change.    All other systems reviewed and are negative. Positives and Pertinent negatives as per HPI. Except as noted above in the ROS, all other systems were reviewed and negative. PAST MEDICAL HISTORY     Past Medical History:   Diagnosis Date    Congenital heart defect     CVA (cerebral infarction)     at 18 months    MR (mental retardation), moderate     Seizure (HCC)     Spastic hemiplegic cerebral palsy (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    Ventricular septal defect          SURGICAL HISTORY     Past Surgical History:   Procedure Laterality Date    JOINT REPLACEMENT           CURRENTMEDICATIONS       Discharge Medication List as of 12/7/2022 11:08 PM        CONTINUE these medications which have NOT CHANGED    Details   rivaroxaban (XARELTO) 20 MG TABS tablet Take 20 mg by mouth daily (with breakfast)Historical Med      digoxin (LANOXIN) 125 MCG tablet Take 1 tablet by mouth daily, Disp-90 tablet, R-0Normal      furosemide (LASIX) 20 MG tablet Take 1 tablet by mouth daily, Disp-90 tablet, R-0Normal      lamoTRIgine (LAMICTAL) 25 MG tablet Take 1 tablet by mouth 2 times daily, Disp-180 tablet, R-0Normal      losartan (COZAAR) 25 MG tablet Take 1 tablet by mouth daily, Disp-30 tablet, R-3Normal      zonisamide (ZONEGRAN) 100 MG capsule Take 1 capsule by mouth 2 times daily, Disp-180 capsule, R-0Normal      loratadine (CLARITIN) 10 MG tablet Take 1 tablet by mouth daily, Disp-30 tablet, R-5Normal      docusate sodium (COLACE) 100 MG capsule Take 1 capsule by mouth 2 times daily, Disp-60 capsule, R-2Normal      fluticasone (FLONASE) 50 MCG/ACT nasal spray 1 spray by Nasal route daily, Disp-1 Bottle, R-3Normal      warfarin (COUMADIN) 7.5 MG tablet Take 7.5 mg by mouthHistorical Med               ALLERGIES     Klonopin [clonazepam]    FAMILYHISTORY     History reviewed. No pertinent family history.        SOCIAL HISTORY       Social History     Tobacco Use    Smoking status: Never Smokeless tobacco: Never   Substance Use Topics    Alcohol use: No    Drug use: No       SCREENINGS    Ary Coma Scale  Eye Opening: Spontaneous  Best Verbal Response: Oriented  Best Motor Response: Obeys commands  Ary Coma Scale Score: 15        PHYSICAL EXAM    (up to 7 for level 4, 8 or more for level 5)     ED Triage Vitals   BP Temp Temp Source Heart Rate Resp SpO2 Height Weight   12/07/22 2015 12/07/22 2050 12/07/22 2050 12/07/22 2015 12/07/22 2015 12/07/22 2015 12/07/22 2015 12/07/22 2015   131/70 98 °F (36.7 °C) Oral 87 18 92 % 5' 7\" (1.702 m) 180 lb (81.6 kg)       Physical Exam  Vitals and nursing note reviewed. Constitutional:       Appearance: He is well-developed. He is not diaphoretic. HENT:      Head: Normocephalic and atraumatic. Right Ear: External ear normal.      Left Ear: External ear normal.   Eyes:      General:         Right eye: No discharge. Left eye: No discharge. Neck:      Vascular: No JVD. Cardiovascular:      Rate and Rhythm: Normal rate. Pulses: Normal pulses. Pulmonary:      Effort: Pulmonary effort is normal. No respiratory distress. Breath sounds: Normal breath sounds. Abdominal:      Palpations: Abdomen is soft. Musculoskeletal:         General: Normal range of motion. Feet:    Skin:     General: Skin is warm and dry. Coloration: Skin is not pale. Neurological:      Mental Status: He is alert. Psychiatric:         Behavior: Behavior normal.       DIAGNOSTIC RESULTS   LABS:    Labs Reviewed - No data to display    When ordered only abnormal lab results are displayed. All other labs were within normal range or not returned as of this dictation. EKG: When ordered, EKG's are interpreted by the Emergency Department Physician in the absence of a cardiologist.  Please see their note for interpretation of EKG.     RADIOLOGY:   Non-plain film images such as CT, Ultrasound and MRI are read by the radiologist. Plain radiographic images are visualized and preliminarily interpreted by the ED Provider with the below findings:        Interpretation per the Radiologist below, if available at the time of this note:    XR FOOT RIGHT (MIN 3 VIEWS)   Final Result   1. Avulsion fracture of the medial base of the 1st proximal phalanx. 2. Nonspecific diffuse with tissue swelling predominately around the forefoot. 3. Diffuse osteopenia. No results found. PROCEDURES   Unless otherwise noted below, none     Procedures    CRITICAL CARE TIME       CONSULTS:  None      EMERGENCY DEPARTMENT COURSE and DIFFERENTIAL DIAGNOSIS/MDM:   Vitals:    Vitals:    12/07/22 2015 12/07/22 2050   BP: 131/70    Pulse: 87    Resp: 18    Temp:  98 °F (36.7 °C)   TempSrc:  Oral   SpO2: 92%    Weight: 180 lb (81.6 kg)    Height: 5' 7\" (1.702 m)        Patient was given the following medications:  Medications   acetaminophen (TYLENOL) tablet 1,000 mg (1,000 mg Oral Given 12/7/22 2056)         Is this patient to be included in the SEP-1 Core Measure due to severe sepsis or septic shock? No   Exclusion criteria - the patient is NOT to be included for SEP-1 Core Measure due to: Infection is not suspected    Briefly, this is a 81-year-old male with medical history including cerebral palsy, systolic heart failure, anticoagulation, hypertension, hemiplegia, seizure disorder that presents to the emergency department with right foot swelling/tenderness without reported injury. XR FOOT RIGHT (MIN 3 VIEWS) (Final result)  Result time 12/07/22 22:31:07  Final result by Achille Severe, DO (12/07/22 22:31:07)                Impression:    1. Avulsion fracture of the medial base of the 1st proximal phalanx. 2. Nonspecific diffuse with tissue swelling predominately around the forefoot. 3. Diffuse osteopenia.                Post op shoe placed, follow up with dr. Sandra Price    I estimate there is LOW risk for COMPARTMENT SYNDROME, DEEP VENOUS THROMBOSIS, SEPTIC ARTHRITIS, TENDON OR NEUROVASCULAR INJURY, thus I consider the discharge disposition reasonable. FINAL IMPRESSION      1.  Avulsion fracture medial base 1st proximal phalanx right foot          DISPOSITION/PLAN   DISPOSITION Decision To Discharge 12/07/2022 10:50:36 PM      PATIENT REFERRED TO:  Richi Oseguera MD  [address]  [phone]    Schedule an appointment as soon as possible for a visit       Natacha Martin MD  [address], [address] [place] [address]  [phone]    Schedule an appointment as soon as possible for a visit       DISCHARGE MEDICATIONS:  Discharge Medication List as of 12/7/2022 11:08 PM          DISCONTINUED MEDICATIONS:  Discharge Medication List as of 12/7/2022 11:08 PM                 (Please note that portions of this note were completed with a voice recognition program.  Efforts were made to edit the dictations but occasionally words are mis-transcribed.)    LESA Saenz CNP (electronically signed)            LESA Saenz CNP  12/07/22 5263

## 2022-12-08 NOTE — ED NOTES
Discharge instructions given, caregiver acknowledged understanding, patient was taken out in his wheelchair upon discharge      Virginia Estrada RN  12/07/22 [facility]

## 2022-12-13 ENCOUNTER — OFFICE VISIT (OUTPATIENT)
Dept: ORTHOPEDIC SURGERY | Age: 32
End: 2022-12-13

## 2022-12-13 VITALS — HEIGHT: 67 IN | WEIGHT: 180 LBS | BODY MASS INDEX: 28.25 KG/M2

## 2022-12-13 DIAGNOSIS — S92.412A DISPLACED FRACTURE OF PROXIMAL PHALANX OF LEFT GREAT TOE, INITIAL ENCOUNTER FOR CLOSED FRACTURE: Primary | ICD-10-CM

## 2022-12-13 NOTE — PROGRESS NOTES
CHIEF COMPLAINT: Right foot pain/ first (great toe) proximal phalanx base minimally displaced fracture. DATE OF INJURY:  12/7/2022, DOT 12/13/2022    HISTORY:  Mr. Haroon Santos 33 y.o.  male presents today for the first visit for evaluation of a right  foot injury which occurred when he tripped over a night stand. He is complaining of left foot first (great toe) pain and swelling. This is better with elevation and worse with bearing any wt. The pain is sharp and not radiating. No other complaint. He was seen 1st at [facility], where he was x-rayed and splinted and asked to f/u with orthopaedics. 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    Ventricular septal defect        Past Surgical History:   Procedure Laterality Date    JOINT REPLACEMENT         Social History     Socioeconomic History    Marital status: Single     Spouse name: Not on file    Number of children: Not on file    Years of education: Not on file    Highest education level: Not on file   Occupational History    Not on file   Tobacco Use    Smoking status: Never    Smokeless tobacco: Never   Substance and Sexual Activity    Alcohol use: No    Drug use: No    Sexual activity: Never   Other Topics Concern    Not on file   Social History Narrative    Not on file     Social Determinants of Health     Financial Resource Strain: Not on file   Food Insecurity: Not on file   Transportation Needs: Not on file   Physical Activity: Inactive    Days of Exercise per Week: 0 days    Minutes of Exercise per Session: 0 min   Stress: Not on file   Social Connections: Not on file   Intimate Partner Violence: Not on file   Housing Stability: Not on file       No family history on file.    Current Outpatient Medications on File Prior to Visit   Medication Sig Dispense Refill    rivaroxaban (XARELTO) 20 MG TABS tablet Take 20 mg by mouth daily (with breakfast)      digoxin (LANOXIN) 125 MCG tablet Take 1 tablet by mouth daily 90 tablet 0    furosemide (LASIX) 20 MG tablet Take 1 tablet by mouth daily 90 tablet 0    lamoTRIgine (LAMICTAL) 25 MG tablet Take 1 tablet by mouth 2 times daily 180 tablet 0    losartan (COZAAR) 25 MG tablet Take 1 tablet by mouth daily 30 tablet 3    zonisamide (ZONEGRAN) 100 MG capsule Take 1 capsule by mouth 2 times daily 180 capsule 0    loratadine (CLARITIN) 10 MG tablet Take 1 tablet by mouth daily 30 tablet 5    docusate sodium (COLACE) 100 MG capsule Take 1 capsule by mouth 2 times daily 60 capsule 2    fluticasone (FLONASE) 50 MCG/ACT nasal spray 1 spray by Nasal route daily 1 Bottle 3    warfarin (COUMADIN) 7.5 MG tablet Take 7.5 mg by mouth (Patient not taking: Reported on 12/7/2022)       No current facility-administered medications on file prior to visit. Pertinent items are noted in HPI  Review of systems reviewed from Patient History Form and available in the patient's chart under the Media tab. No change noted. PHYSICAL EXAMINATION:  Mr. Alistair Loco is a very pleasant 33 y.o.  male who presents today in no acute distress, awake, alert, and oriented. He is well dressed, nourished and  groomed. Patient with normal affect. Height is  5' 7\" (1.702 m), weight is 180 lb (81.6 kg), Body mass index is 28.19 kg/m². Resting respiratory rate is 16. Examination of both feet and ankles showing a decreased range of motion of the left foot first (great toe) compare to the other side because of pain. There is moderate swelling that can be seen, as well as ecchymosis over first (great toe) of the left foot. He has intact sensation and good pedal pulses.  He has significant tenderness on deep palpation over the first (great toe) proximal

## 2023-02-07 ENCOUNTER — OFFICE VISIT (OUTPATIENT)
Dept: ORTHOPEDIC SURGERY | Age: 33
End: 2023-02-07

## 2023-02-07 VITALS — WEIGHT: 180 LBS | BODY MASS INDEX: 28.25 KG/M2 | HEIGHT: 67 IN

## 2023-02-07 DIAGNOSIS — S92.412A DISPLACED FRACTURE OF PROXIMAL PHALANX OF LEFT GREAT TOE, INITIAL ENCOUNTER FOR CLOSED FRACTURE: Primary | ICD-10-CM

## 2023-02-07 PROCEDURE — 99024 POSTOP FOLLOW-UP VISIT: CPT | Performed by: NURSE PRACTITIONER

## 2023-02-07 PROCEDURE — APPNB15 APP NON BILLABLE TIME 0-15 MINS: Performed by: NURSE PRACTITIONER

## 2023-02-07 NOTE — PROGRESS NOTES
CHIEF COMPLAINT: Right foot pain/ first (great toe) proximal phalanx base minimally displaced fracture. DATE OF INJURY:  12/7/2022, DOT 12/13/2022    HISTORY:  Mr. Meenu Metz 33 y.o.  male presents today for follow-up visit for evaluation of a right  foot injury which occurred when he tripped over a night stand. He is complaining of left foot first (great toe) pain and swelling that is much improved and rates the pain a 0/10 VAS. He only has pain if he is pushing on it really hard. No other complaint. He was seen 1st at [facility], where he was x-rayed and splinted and asked to f/u with orthopaedics. 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 He has significant spasticity in the right lower extremity and right upper extremity. He has cerebral palsy, mental retardation and has a history of a CVA.     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 hip    Ventricular septal defect        Past Surgical History:   Procedure Laterality Date    JOINT REPLACEMENT         Social History     Socioeconomic History    Marital status: Single     Spouse name: Not on file    Number of children: Not on file    Years of education: Not on file    Highest education level: Not on file   Occupational History    Not on file   Tobacco Use    Smoking status: Never    Smokeless tobacco: Never   Substance and Sexual Activity    Alcohol use: No    Drug use: No    Sexual activity: Never   Other Topics Concern    Not on file   Social History Narrative    Not on file     Social Determinants of Health     Financial Resource Strain: Not on file   Food Insecurity: Not on file   Transportation Needs: Not on file   Physical Activity: Inactive    Days of Exercise per Week: 0 days    Minutes of Exercise per Session: 0 min   Stress: Not on file   Social Connections: Not on file   Intimate Partner Violence: Not on file   Housing Stability: Not on file       No family history on file. Current Outpatient Medications on File Prior to Visit   Medication Sig Dispense Refill    rivaroxaban (XARELTO) 20 MG TABS tablet Take 20 mg by mouth daily (with breakfast)      digoxin (LANOXIN) 125 MCG tablet Take 1 tablet by mouth daily 90 tablet 0    furosemide (LASIX) 20 MG tablet Take 1 tablet by mouth daily 90 tablet 0    lamoTRIgine (LAMICTAL) 25 MG tablet Take 1 tablet by mouth 2 times daily 180 tablet 0    losartan (COZAAR) 25 MG tablet Take 1 tablet by mouth daily 30 tablet 3    zonisamide (ZONEGRAN) 100 MG capsule Take 1 capsule by mouth 2 times daily 180 capsule 0    loratadine (CLARITIN) 10 MG tablet Take 1 tablet by mouth daily 30 tablet 5    docusate sodium (COLACE) 100 MG capsule Take 1 capsule by mouth 2 times daily 60 capsule 2    fluticasone (FLONASE) 50 MCG/ACT nasal spray 1 spray by Nasal route daily 1 Bottle 3    warfarin (COUMADIN) 7.5 MG tablet Take 7.5 mg by mouth (Patient not taking: Reported on 12/7/2022)       No current facility-administered medications on file prior to visit. Pertinent items are noted in HPI  Review of systems reviewed from Patient History Form and available in the patient's chart under the Media tab. No change noted. PHYSICAL EXAMINATION:  Mr. Camille Joyce is a very pleasant 33 y.o.  male who presents today in no acute distress, awake, alert, and oriented. He is well dressed, nourished and  groomed. Patient with normal affect. Height is  5' 7\" (1.702 m), weight is 180 lb (81.6 kg), Body mass index is 28.19 kg/m². Resting respiratory rate is 16. Examination of both feet and ankles showing a decreased range of motion of the left foot first (great toe) compare to the other side because of pain.   There is mild swelling that can be seen, no ecchymosis over first (great toe) of the left foot. He has intact sensation and good pedal pulses. He has no tenderness on deep palpation over the first (great toe) proximal phalanx left foot. IMAGING: Ethan Gillis were reviewed, dated today in office, 3 views of the left foot, and showed a first (great toe) proximal phalanx medial base minimally displaced fracture. IMPRESSION: Left foot first (great toe) proximal phalanx medial base minimally displaced fracture. PLAN: I discussed that the overall alignment of this fracture is good. He can be WB as tolerated and discontinue the taping. We discussed the risk of nonunion and  malunion. We will see him  back in 2 months at which time we will get a new xray of the left foot.             Ana Maria Cuello, APRN - CNP

## 2023-06-22 ENCOUNTER — OFFICE VISIT (OUTPATIENT)
Dept: FAMILY MEDICINE CLINIC | Age: 33
End: 2023-06-22
Payer: MEDICAID

## 2023-06-22 VITALS
BODY MASS INDEX: 28.25 KG/M2 | OXYGEN SATURATION: 91 % | SYSTOLIC BLOOD PRESSURE: 96 MMHG | HEIGHT: 67 IN | HEART RATE: 82 BPM | WEIGHT: 180 LBS | DIASTOLIC BLOOD PRESSURE: 60 MMHG

## 2023-06-22 DIAGNOSIS — I50.812 CHRONIC RIGHT-SIDED CONGESTIVE HEART FAILURE (HCC): ICD-10-CM

## 2023-06-22 DIAGNOSIS — I50.22 CHRONIC SYSTOLIC CONGESTIVE HEART FAILURE (HCC): ICD-10-CM

## 2023-06-22 DIAGNOSIS — G40.909 SEIZURE DISORDER (HCC): Primary | ICD-10-CM

## 2023-06-22 DIAGNOSIS — G81.01 FLACCID HEMIPLEGIA AFFECTING RIGHT DOMINANT SIDE, UNSPECIFIED ETIOLOGY (HCC): ICD-10-CM

## 2023-06-22 DIAGNOSIS — G80.2 SPASTIC HEMIPLEGIC CEREBRAL PALSY (HCC): ICD-10-CM

## 2023-06-22 DIAGNOSIS — G81.10 SPASTIC HEMIPLEGIA AFFECTING DOMINANT SIDE (HCC): ICD-10-CM

## 2023-06-22 PROCEDURE — 99204 OFFICE O/P NEW MOD 45 MIN: CPT | Performed by: NURSE PRACTITIONER

## 2023-06-22 PROCEDURE — 3078F DIAST BP <80 MM HG: CPT | Performed by: NURSE PRACTITIONER

## 2023-06-22 PROCEDURE — 3074F SYST BP LT 130 MM HG: CPT | Performed by: NURSE PRACTITIONER

## 2023-06-22 SDOH — ECONOMIC STABILITY: FOOD INSECURITY: WITHIN THE PAST 12 MONTHS, THE FOOD YOU BOUGHT JUST DIDN'T LAST AND YOU DIDN'T HAVE MONEY TO GET MORE.: NEVER TRUE

## 2023-06-22 SDOH — ECONOMIC STABILITY: FOOD INSECURITY: WITHIN THE PAST 12 MONTHS, YOU WORRIED THAT YOUR FOOD WOULD RUN OUT BEFORE YOU GOT MONEY TO BUY MORE.: NEVER TRUE

## 2023-06-22 SDOH — ECONOMIC STABILITY: INCOME INSECURITY: HOW HARD IS IT FOR YOU TO PAY FOR THE VERY BASICS LIKE FOOD, HOUSING, MEDICAL CARE, AND HEATING?: NOT HARD AT ALL

## 2023-06-22 SDOH — ECONOMIC STABILITY: HOUSING INSECURITY
IN THE LAST 12 MONTHS, WAS THERE A TIME WHEN YOU DID NOT HAVE A STEADY PLACE TO SLEEP OR SLEPT IN A SHELTER (INCLUDING NOW)?: NO

## 2023-06-22 ASSESSMENT — PATIENT HEALTH QUESTIONNAIRE - PHQ9
SUM OF ALL RESPONSES TO PHQ QUESTIONS 1-9: 0
SUM OF ALL RESPONSES TO PHQ9 QUESTIONS 1 & 2: 0
SUM OF ALL RESPONSES TO PHQ QUESTIONS 1-9: 0
2. FEELING DOWN, DEPRESSED OR HOPELESS: 0
1. LITTLE INTEREST OR PLEASURE IN DOING THINGS: 0
SUM OF ALL RESPONSES TO PHQ QUESTIONS 1-9: 0
SUM OF ALL RESPONSES TO PHQ QUESTIONS 1-9: 0

## 2023-06-22 ASSESSMENT — ENCOUNTER SYMPTOMS
NAUSEA: 0
WHEEZING: 0
VOMITING: 0
SORE THROAT: 0
SINUS PAIN: 0
COUGH: 0
SHORTNESS OF BREATH: 0
CONSTIPATION: 0
BLOOD IN STOOL: 0
DIARRHEA: 0
CHEST TIGHTNESS: 0
EYES NEGATIVE: 1
SINUS PRESSURE: 0
ABDOMINAL PAIN: 0

## 2023-06-22 NOTE — PROGRESS NOTES
2023    Bre Samayoa (:  1990) is a 33 y.o. male, here for evaluation of the following medical concerns:    Chief Complaint   Patient presents with    New Patient     Establish new pcp     Past medical, surgical, family and social history, as well as current medications and allergies, were reviewed and updated with the patient. Patient is a new patient to our practice with no concerns today. Lives at [address] [place] [address]. Seizure:  last seizure is unknown, has been a long time. He is not currently followed by neurology. Cardiology:  this is managed by [facility]. He does take coumadin and this is managed by [facility]. He is wheelchair bound but can move himself while on the floor, he is unable to walk. He is capable of managing all of his daily ADL's on his own with little help. He is continent of stool and urine. He does need someone to cook for him and drive him. His father visits often. Review of Systems   Constitutional:  Negative for chills, diaphoresis, fatigue and fever. HENT:  Negative for congestion, ear discharge, ear pain, sinus pressure, sinus pain and sore throat. Eyes: Negative. Respiratory:  Negative for cough, chest tightness, shortness of breath and wheezing. Cardiovascular:  Negative for chest pain, palpitations and leg swelling. Gastrointestinal:  Negative for abdominal pain, blood in stool, constipation, diarrhea, nausea and vomiting. Endocrine: Negative. Genitourinary: Negative. Musculoskeletal: Negative. Skin: Negative. Neurological:  Negative for dizziness, weakness and headaches. Psychiatric/Behavioral: Negative. Prior to Visit Medications    Medication Sig Taking?  Authorizing Provider   loratadine (CLARITIN) 10 MG tablet Take 1 tablet by mouth daily Yes Jean Severino MD   rivaroxaban (XARELTO) 20 MG TABS tablet Take 1 tablet by mouth daily (with breakfast) Yes Historical Provider, MD   losartan (COZAAR) 25

## 2024-02-22 ENCOUNTER — OFFICE VISIT (OUTPATIENT)
Dept: FAMILY MEDICINE CLINIC | Age: 34
End: 2024-02-22
Payer: MEDICAID

## 2024-02-22 VITALS
OXYGEN SATURATION: 93 % | DIASTOLIC BLOOD PRESSURE: 70 MMHG | TEMPERATURE: 97 F | HEART RATE: 77 BPM | SYSTOLIC BLOOD PRESSURE: 110 MMHG | RESPIRATION RATE: 16 BRPM

## 2024-02-22 DIAGNOSIS — Z23 NEED FOR VACCINATION: ICD-10-CM

## 2024-02-22 DIAGNOSIS — Z00.00 ANNUAL PHYSICAL EXAM: Primary | ICD-10-CM

## 2024-02-22 PROBLEM — K76.1 CHRONIC PASSIVE HEPATIC CONGESTION: Status: ACTIVE | Noted: 2023-01-20

## 2024-02-22 PROCEDURE — 99395 PREV VISIT EST AGE 18-39: CPT | Performed by: NURSE PRACTITIONER

## 2024-02-22 PROCEDURE — 90636 HEP A/HEP B VACC ADULT IM: CPT | Performed by: NURSE PRACTITIONER

## 2024-02-22 PROCEDURE — 3074F SYST BP LT 130 MM HG: CPT | Performed by: NURSE PRACTITIONER

## 2024-02-22 PROCEDURE — 90472 IMMUNIZATION ADMIN EACH ADD: CPT | Performed by: NURSE PRACTITIONER

## 2024-02-22 PROCEDURE — 3078F DIAST BP <80 MM HG: CPT | Performed by: NURSE PRACTITIONER

## 2024-02-22 PROCEDURE — 90471 IMMUNIZATION ADMIN: CPT | Performed by: NURSE PRACTITIONER

## 2024-02-22 PROCEDURE — 90686 IIV4 VACC NO PRSV 0.5 ML IM: CPT | Performed by: NURSE PRACTITIONER

## 2024-02-22 PROCEDURE — 90716 VAR VACCINE LIVE SUBQ: CPT | Performed by: NURSE PRACTITIONER

## 2024-02-22 ASSESSMENT — PATIENT HEALTH QUESTIONNAIRE - PHQ9
SUM OF ALL RESPONSES TO PHQ QUESTIONS 1-9: 0
1. LITTLE INTEREST OR PLEASURE IN DOING THINGS: 0
SUM OF ALL RESPONSES TO PHQ QUESTIONS 1-9: 0
SUM OF ALL RESPONSES TO PHQ9 QUESTIONS 1 & 2: 0
2. FEELING DOWN, DEPRESSED OR HOPELESS: 0

## 2024-02-22 ASSESSMENT — ENCOUNTER SYMPTOMS
SINUS PAIN: 0
VOMITING: 0
SHORTNESS OF BREATH: 0
WHEEZING: 0
CONSTIPATION: 0
COUGH: 0
BLOOD IN STOOL: 0
SORE THROAT: 0
ABDOMINAL PAIN: 0
EYES NEGATIVE: 1
SINUS PRESSURE: 0
DIARRHEA: 0
CHEST TIGHTNESS: 0
NAUSEA: 0

## 2024-02-22 NOTE — PROGRESS NOTES
for vaccination  Given today  Will need to come back for nurse visit in one month for second Hep B and 6 months for third Hep B vaccine  - Influenza, FLUCELVAX, (age 6 mo+), IM, Preservative Free, 0.5 mL  - Hep A-Hep B, TWINRIX, (age 18 yrs+), IM  - Varicella, VARIVAX, (age 12 mo+), SC    Return in about 4 weeks (around 3/21/2024), or if symptoms worsen or fail to improve, for only nurse visit for hep B.

## 2024-02-23 RX ORDER — LORATADINE 10 MG/1
10 TABLET ORAL DAILY
COMMUNITY

## 2024-02-23 RX ORDER — PAROXETINE HYDROCHLORIDE 40 MG/1
40 TABLET, FILM COATED ORAL DAILY
COMMUNITY

## 2024-02-23 RX ORDER — ARIPIPRAZOLE 5 MG/1
5 TABLET ORAL DAILY
COMMUNITY
Start: 2023-12-30

## 2024-03-25 ENCOUNTER — NURSE ONLY (OUTPATIENT)
Dept: FAMILY MEDICINE CLINIC | Age: 34
End: 2024-03-25
Payer: MEDICAID

## 2024-03-25 DIAGNOSIS — Z23 NEED FOR PROPHYLACTIC VACCINATION AND INOCULATION AGAINST VIRAL HEPATITIS: Primary | ICD-10-CM

## 2024-03-25 PROCEDURE — 90471 IMMUNIZATION ADMIN: CPT | Performed by: NURSE PRACTITIONER

## 2024-03-25 PROCEDURE — 90739 HEPB VACC 2/4 DOSE ADULT IM: CPT | Performed by: NURSE PRACTITIONER

## 2024-04-17 ENCOUNTER — TELEPHONE (OUTPATIENT)
Dept: FAMILY MEDICINE CLINIC | Age: 34
End: 2024-04-17

## 2024-04-17 DIAGNOSIS — R05.1 ACUTE COUGH: Primary | ICD-10-CM

## 2024-04-17 RX ORDER — BROMPHENIRAMINE MALEATE, PSEUDOEPHEDRINE HYDROCHLORIDE, AND DEXTROMETHORPHAN HYDROBROMIDE 2; 30; 10 MG/5ML; MG/5ML; MG/5ML
5 SYRUP ORAL 4 TIMES DAILY PRN
Qty: 200 ML | Refills: 0 | Status: SHIPPED | OUTPATIENT
Start: 2024-04-17 | End: 2024-04-27

## 2024-04-17 NOTE — TELEPHONE ENCOUNTER
I have this medication pended for aide to come and  so she can take to pharmacy. I have marked as print.   Lov 2/22/24  Lrf 200 0 3/10/20

## 2024-07-15 ENCOUNTER — TELEPHONE (OUTPATIENT)
Dept: FAMILY MEDICINE CLINIC | Age: 34
End: 2024-07-15

## 2024-07-15 NOTE — TELEPHONE ENCOUNTER
Tiffanie Dropped off Unsigned Physician orders med list that needs bryn' signature. Please call once completed. Scanned in chart, and placed in folder in bryn's mailbox up front. There is also another patient's form in the folder as well for bryn to sign.    Please call Tiffanie once [phone]

## 2024-07-16 NOTE — TELEPHONE ENCOUNTER
Tiffanie called and said the forms came from the pharm. And just to loly out old pcp and add new pcp which is Kalyn

## 2024-07-16 NOTE — TELEPHONE ENCOUNTER
Tiffanie called back and informed her of bryn's message and will drop off new forms with updated PCP

## 2024-07-16 NOTE — TELEPHONE ENCOUNTER
please call Tiffanie at the group home and tell her I need Dr. Scales's name taken off and my name put on

## 2024-07-18 NOTE — TELEPHONE ENCOUNTER
Called Tiffanie Moreno stated she will change PCP on form and will drop off form once that is done

## 2024-07-19 ENCOUNTER — TELEPHONE (OUTPATIENT)
Dept: FAMILY MEDICINE CLINIC | Age: 34
End: 2024-07-19

## 2024-09-25 ENCOUNTER — LAB (OUTPATIENT)
Dept: FAMILY MEDICINE CLINIC | Age: 34
End: 2024-09-25
Payer: MEDICAID

## 2024-09-25 DIAGNOSIS — Z23 NEED FOR VACCINATION: Primary | ICD-10-CM

## 2024-09-25 PROCEDURE — 90471 IMMUNIZATION ADMIN: CPT | Performed by: NURSE PRACTITIONER

## 2024-09-25 PROCEDURE — 90636 HEP A/HEP B VACC ADULT IM: CPT | Performed by: NURSE PRACTITIONER

## 2024-10-24 ENCOUNTER — OFFICE VISIT (OUTPATIENT)
Dept: FAMILY MEDICINE CLINIC | Age: 34
End: 2024-10-24

## 2024-10-24 VITALS
OXYGEN SATURATION: 92 % | HEIGHT: 67 IN | DIASTOLIC BLOOD PRESSURE: 68 MMHG | HEART RATE: 89 BPM | SYSTOLIC BLOOD PRESSURE: 120 MMHG | WEIGHT: 180 LBS | BODY MASS INDEX: 28.25 KG/M2 | RESPIRATION RATE: 16 BRPM

## 2024-10-24 DIAGNOSIS — R05.1 ACUTE COUGH: ICD-10-CM

## 2024-10-24 DIAGNOSIS — Q21.0 VENTRICULAR SEPTAL DEFECT: ICD-10-CM

## 2024-10-24 DIAGNOSIS — G80.2 SPASTIC HEMIPLEGIC CEREBRAL PALSY (HCC): ICD-10-CM

## 2024-10-24 DIAGNOSIS — I50.812 CHRONIC RIGHT-SIDED CONGESTIVE HEART FAILURE (HCC): ICD-10-CM

## 2024-10-24 DIAGNOSIS — B34.9 VIRAL ILLNESS: Primary | ICD-10-CM

## 2024-10-24 DIAGNOSIS — G40.909 SEIZURE DISORDER (HCC): ICD-10-CM

## 2024-10-24 LAB
25(OH)D3 SERPL-MCNC: 30 NG/ML
ALBUMIN SERPL-MCNC: 4.5 G/DL (ref 3.4–5)
ALBUMIN/GLOB SERPL: 1.6 {RATIO} (ref 1.1–2.2)
ALP SERPL-CCNC: 114 U/L (ref 40–129)
ALT SERPL-CCNC: 61 U/L (ref 10–40)
ANION GAP SERPL CALCULATED.3IONS-SCNC: 13 MMOL/L (ref 3–16)
AST SERPL-CCNC: 40 U/L (ref 15–37)
BASOPHILS # BLD: 0.1 K/UL (ref 0–0.2)
BASOPHILS NFR BLD: 0.6 %
BILIRUB SERPL-MCNC: 0.7 MG/DL (ref 0–1)
BILIRUBIN, POC: NORMAL
BLOOD URINE, POC: NORMAL
BUN SERPL-MCNC: 13 MG/DL (ref 7–20)
CALCIUM SERPL-MCNC: 9.5 MG/DL (ref 8.3–10.6)
CHLORIDE SERPL-SCNC: 104 MMOL/L (ref 99–110)
CLARITY, POC: NORMAL
CO2 SERPL-SCNC: 24 MMOL/L (ref 21–32)
COLOR, POC: NORMAL
CREAT SERPL-MCNC: 1.1 MG/DL (ref 0.9–1.3)
DEPRECATED RDW RBC AUTO: 13.3 % (ref 12.4–15.4)
EOSINOPHIL # BLD: 0.8 K/UL (ref 0–0.6)
EOSINOPHIL NFR BLD: 9.4 %
GFR SERPLBLD CREATININE-BSD FMLA CKD-EPI: >90 ML/MIN/{1.73_M2}
GLUCOSE P FAST SERPL-MCNC: 80 MG/DL (ref 70–99)
GLUCOSE URINE, POC: NORMAL MG/DL
HCT VFR BLD AUTO: 51.2 % (ref 40.5–52.5)
HCV AB SERPL QL IA: NORMAL
HGB BLD-MCNC: 17.5 G/DL (ref 13.5–17.5)
INFLUENZA A ANTIGEN, POC: NORMAL
INFLUENZA B ANTIGEN, POC: NORMAL
KETONES, POC: NORMAL MG/DL
LEUKOCYTE EST, POC: NORMAL
LYMPHOCYTES # BLD: 0.7 K/UL (ref 1–5.1)
LYMPHOCYTES NFR BLD: 8.7 %
Lab: NORMAL
MCH RBC QN AUTO: 32.6 PG (ref 26–34)
MCHC RBC AUTO-ENTMCNC: 34.3 G/DL (ref 31–36)
MCV RBC AUTO: 95.2 FL (ref 80–100)
MONOCYTES # BLD: 1 K/UL (ref 0–1.3)
MONOCYTES NFR BLD: 12.7 %
NEUTROPHILS # BLD: 5.5 K/UL (ref 1.7–7.7)
NEUTROPHILS NFR BLD: 68.6 %
NITRITE, POC: NORMAL
PH, POC: 5.5
PLATELET # BLD AUTO: 203 K/UL (ref 135–450)
PMV BLD AUTO: 9.6 FL (ref 5–10.5)
POTASSIUM SERPL-SCNC: 3.7 MMOL/L (ref 3.5–5.1)
PROT SERPL-MCNC: 7.3 G/DL (ref 6.4–8.2)
PROTEIN, POC: NORMAL MG/DL
QC PASS/FAIL: NORMAL
RBC # BLD AUTO: 5.38 M/UL (ref 4.2–5.9)
RSV RNA: NORMAL
S PYO AG THROAT QL: NORMAL
SARS-COV-2 RDRP RESP QL NAA+PROBE: NEGATIVE
SODIUM SERPL-SCNC: 141 MMOL/L (ref 136–145)
SPECIFIC GRAVITY, POC: >=1.03
TSH SERPL DL<=0.005 MIU/L-ACNC: 1.42 UIU/ML (ref 0.27–4.2)
UROBILINOGEN, POC: 1 MG/DL
WBC # BLD AUTO: 8.1 K/UL (ref 4–11)

## 2024-10-24 SDOH — ECONOMIC STABILITY: FOOD INSECURITY: WITHIN THE PAST 12 MONTHS, YOU WORRIED THAT YOUR FOOD WOULD RUN OUT BEFORE YOU GOT MONEY TO BUY MORE.: NEVER TRUE

## 2024-10-24 SDOH — ECONOMIC STABILITY: INCOME INSECURITY: HOW HARD IS IT FOR YOU TO PAY FOR THE VERY BASICS LIKE FOOD, HOUSING, MEDICAL CARE, AND HEATING?: NOT HARD AT ALL

## 2024-10-24 SDOH — ECONOMIC STABILITY: FOOD INSECURITY: WITHIN THE PAST 12 MONTHS, THE FOOD YOU BOUGHT JUST DIDN'T LAST AND YOU DIDN'T HAVE MONEY TO GET MORE.: NEVER TRUE

## 2024-10-24 ASSESSMENT — ENCOUNTER SYMPTOMS
SINUS PAIN: 0
SHORTNESS OF BREATH: 0
CHEST TIGHTNESS: 0
SORE THROAT: 1
SINUS PRESSURE: 0
RHINORRHEA: 0
COUGH: 1
WHEEZING: 0
DIARRHEA: 1

## 2024-10-24 ASSESSMENT — PATIENT HEALTH QUESTIONNAIRE - PHQ9
SUM OF ALL RESPONSES TO PHQ QUESTIONS 1-9: 0
SUM OF ALL RESPONSES TO PHQ QUESTIONS 1-9: 0
1. LITTLE INTEREST OR PLEASURE IN DOING THINGS: NOT AT ALL
SUM OF ALL RESPONSES TO PHQ9 QUESTIONS 1 & 2: 0
2. FEELING DOWN, DEPRESSED OR HOPELESS: NOT AT ALL
SUM OF ALL RESPONSES TO PHQ QUESTIONS 1-9: 0
SUM OF ALL RESPONSES TO PHQ QUESTIONS 1-9: 0

## 2024-10-24 NOTE — PROGRESS NOTES
10/24/2024     Yamil Campbell (:  1990) is a 34 y.o. male, here for evaluation of the following medical concerns:    Chief Complaint   Patient presents with    Cough     X3 days     Sore Throat     X3 days     Diarrhea    Congestion     Cough started about three days ago with.  Having sore throat, congestion and some diarrhea.  Eating and drinking ok, does not like to drink water, prefers to drink juice.  Having diarrhea for three days but is still continent of stool.  Denies blood or mucous in stools.  Denies abdominal pain or vomiting.  Denies fever, shortness of breath or wheezing.  Admits that he is feeling better than when symptoms started a few days ago.    Cerebral Palsy: stable, wheelchair bound, able to move himself while on the floor or in his wheelchair, has some contractures of wrists.  Able to do his ADL's with minimal assistance.  Continent of stool and urine.  Currently living in a group home.    Seizure:  last seizure is unknown, has been a long time.  He is not currently followed by neurology.       Cardiology:  this is managed by [facility].  He does take coumadin, this is managed by [facility].     Review of Systems   Constitutional:  Negative for chills, diaphoresis, fatigue and fever.   HENT:  Positive for congestion, postnasal drip and sore throat. Negative for ear discharge, ear pain, rhinorrhea, sinus pressure, sinus pain and sneezing.    Respiratory:  Positive for cough. Negative for chest tightness, shortness of breath and wheezing.    Cardiovascular:  Negative for chest pain and palpitations.   Gastrointestinal:  Positive for diarrhea.   Genitourinary: Negative.    Musculoskeletal: Negative.    Neurological:  Negative for dizziness, tremors, seizures, syncope, weakness and headaches.     Prior to Visit Medications    Medication Sig Taking? Authorizing Provider   ARIPiprazole (ABILIFY) 5 MG tablet Take 1 tablet by mouth daily Yes Provider, MD Anay   loratadine (CLARITIN) 10 MG

## 2024-10-24 NOTE — PATIENT INSTRUCTIONS
Recommend drinking plenty of fluids, rest as much as possible, tylenol or motrin as needed for body aches, pain or fever.    May also use other the counter cold and flu medications such as NyQuil or DayQuil as needed.  If using OTC cough and cold medication avoid tylenol.    All testing negative today

## 2024-10-25 LAB
EST. AVERAGE GLUCOSE BLD GHB EST-MCNC: 102.5 MG/DL
HBA1C MFR BLD: 5.2 %
HIV 1+2 AB+HIV1 P24 AG SERPL QL IA: NORMAL
HIV 2 AB SERPL QL IA: NORMAL
HIV1 AB SERPL QL IA: NORMAL
HIV1 P24 AG SERPL QL IA: NORMAL

## 2024-11-05 ENCOUNTER — TELEPHONE (OUTPATIENT)
Dept: FAMILY MEDICINE CLINIC | Age: 34
End: 2024-11-05

## 2024-11-05 NOTE — TELEPHONE ENCOUNTER
Patient  called and needs to have a prescription placed to have patient wheel chair fixed and diagnostic done on wheel chair     Please call   [facility]   Fax number [phone]  Phone number [phone]  Nancy Yeager

## 2024-11-06 ENCOUNTER — TELEPHONE (OUTPATIENT)
Dept: FAMILY MEDICINE CLINIC | Age: 34
End: 2024-11-06

## 2024-11-08 DIAGNOSIS — G80.2 SPASTIC HEMIPLEGIC CEREBRAL PALSY (HCC): ICD-10-CM

## 2024-11-08 DIAGNOSIS — G81.10 SPASTIC HEMIPLEGIA AFFECTING DOMINANT SIDE (HCC): ICD-10-CM

## 2024-11-08 DIAGNOSIS — G81.01 FLACCID HEMIPLEGIA AFFECTING RIGHT DOMINANT SIDE, UNSPECIFIED ETIOLOGY (HCC): Primary | ICD-10-CM

## 2024-11-18 ENCOUNTER — TELEPHONE (OUTPATIENT)
Dept: FAMILY MEDICINE CLINIC | Age: 34
End: 2024-11-18

## 2025-04-02 ENCOUNTER — OFFICE VISIT (OUTPATIENT)
Dept: FAMILY MEDICINE CLINIC | Age: 35
End: 2025-04-02
Payer: MEDICAID

## 2025-04-02 VITALS
OXYGEN SATURATION: 96 % | DIASTOLIC BLOOD PRESSURE: 74 MMHG | RESPIRATION RATE: 16 BRPM | HEART RATE: 86 BPM | SYSTOLIC BLOOD PRESSURE: 124 MMHG

## 2025-04-02 DIAGNOSIS — Z00.00 ENCOUNTER FOR WELL ADULT EXAM WITHOUT ABNORMAL FINDINGS: ICD-10-CM

## 2025-04-02 DIAGNOSIS — Q21.0 VENTRICULAR SEPTAL DEFECT: ICD-10-CM

## 2025-04-02 DIAGNOSIS — Z23 NEED FOR VACCINATION: ICD-10-CM

## 2025-04-02 DIAGNOSIS — I50.42 CHRONIC COMBINED SYSTOLIC AND DIASTOLIC CONGESTIVE HEART FAILURE (HCC): Primary | ICD-10-CM

## 2025-04-02 DIAGNOSIS — G81.10 SPASTIC HEMIPLEGIA AFFECTING DOMINANT SIDE (HCC): ICD-10-CM

## 2025-04-02 DIAGNOSIS — G80.2 SPASTIC HEMIPLEGIC CEREBRAL PALSY (HCC): ICD-10-CM

## 2025-04-02 DIAGNOSIS — G40.909 SEIZURE DISORDER (HCC): ICD-10-CM

## 2025-04-02 DIAGNOSIS — I10 ESSENTIAL HYPERTENSION: ICD-10-CM

## 2025-04-02 DIAGNOSIS — G81.01 FLACCID HEMIPLEGIA AFFECTING RIGHT DOMINANT SIDE, UNSPECIFIED ETIOLOGY (HCC): ICD-10-CM

## 2025-04-02 DIAGNOSIS — I50.22 CHRONIC SYSTOLIC CONGESTIVE HEART FAILURE (HCC): ICD-10-CM

## 2025-04-02 PROCEDURE — 90471 IMMUNIZATION ADMIN: CPT | Performed by: NURSE PRACTITIONER

## 2025-04-02 PROCEDURE — 3074F SYST BP LT 130 MM HG: CPT | Performed by: NURSE PRACTITIONER

## 2025-04-02 PROCEDURE — 90677 PCV20 VACCINE IM: CPT | Performed by: NURSE PRACTITIONER

## 2025-04-02 PROCEDURE — 3078F DIAST BP <80 MM HG: CPT | Performed by: NURSE PRACTITIONER

## 2025-04-02 PROCEDURE — 90472 IMMUNIZATION ADMIN EACH ADD: CPT | Performed by: NURSE PRACTITIONER

## 2025-04-02 PROCEDURE — 99214 OFFICE O/P EST MOD 30 MIN: CPT | Performed by: NURSE PRACTITIONER

## 2025-04-02 PROCEDURE — 99395 PREV VISIT EST AGE 18-39: CPT | Performed by: NURSE PRACTITIONER

## 2025-04-02 PROCEDURE — 90716 VAR VACCINE LIVE SUBQ: CPT | Performed by: NURSE PRACTITIONER

## 2025-04-02 SDOH — ECONOMIC STABILITY: FOOD INSECURITY: WITHIN THE PAST 12 MONTHS, THE FOOD YOU BOUGHT JUST DIDN'T LAST AND YOU DIDN'T HAVE MONEY TO GET MORE.: NEVER TRUE

## 2025-04-02 SDOH — ECONOMIC STABILITY: FOOD INSECURITY: WITHIN THE PAST 12 MONTHS, YOU WORRIED THAT YOUR FOOD WOULD RUN OUT BEFORE YOU GOT MONEY TO BUY MORE.: NEVER TRUE

## 2025-04-02 ASSESSMENT — PATIENT HEALTH QUESTIONNAIRE - PHQ9
2. FEELING DOWN, DEPRESSED OR HOPELESS: NOT AT ALL
SUM OF ALL RESPONSES TO PHQ QUESTIONS 1-9: 0
1. LITTLE INTEREST OR PLEASURE IN DOING THINGS: NOT AT ALL
SUM OF ALL RESPONSES TO PHQ QUESTIONS 1-9: 0

## 2025-04-02 NOTE — PROGRESS NOTES
Well Adult Note  Name: Yamil Campbell Today’s Date: 2025   MRN: [id number] Sex: Male   Age: 34 y.o. Ethnicity: Non- / Non    : 1990 Race: White (non-)      Yamil Campbell is here for a well adult exam.       Assessment & Plan   Chronic combined systolic and diastolic congestive heart failure (HCC)  Stable, managed by [facility] cardiology  -     ESTABLISHED, MOD MDM, 30-39 MIN [29493]  Seizure disorder (HCC)   stable  Spastic hemiplegic cerebral palsy (HCC)   stable  Ventricular septal defect   stable  Encounter for well adult exam without abnormal findings  Flaccid hemiplegia affecting right dominant side, unspecified etiology (HCC)   stable  Spastic hemiplegia affecting dominant side (HCC)   stable  Chronic systolic congestive heart failure (HCC)   stable  Essential hypertension   stable  Need for vaccination  -     Varicella, VARIVAX, (age 12 mo+), SC  -     Pneumococcal, PCV20, PREVNAR 20, (age 6w+), IM, PF  -     Tdap, BOOSTRIX, (age 10 yrs+), IM; Future         Return in about 6 months (around 10/2/2025), or if symptoms worsen or fail to improve.       Subjective   History:    CHF:this is managed by [facility].  He does take coumadin, this is managed by [facility].  Seizure: last seizure is unknown, has been a long time.  He is not currently followed by neurology.    Cerebral Palsy/Spastic Hemiplegia: stable, wheelchair bound, able to move himself while on the floor or in his wheelchair, has some contractures of wrists.  Able to do his ADL's with minimal assistance.  Continent of stool and urine.  Currently living in a group home.  HTN:  stable, denies chest pain, shortness of breath, dizziness, palpitations or edema.      Review of Systems   Constitutional:  Negative for chills, diaphoresis, fatigue and fever.   HENT:  Negative for congestion, ear discharge, ear pain, sinus pressure, sinus pain and sore throat.    Eyes: Negative.    Respiratory:  Negative for cough, chest tightness,

## 2025-04-02 NOTE — PATIENT INSTRUCTIONS
PLEASE SEND US A COPY OF HIS MEDICATION LIST     Well Visit, Ages 18 to 65: Care Instructions  Well visits can help you stay healthy. Your doctor has checked your overall health and may have suggested ways to take good care of yourself. Your doctor also may have recommended tests. You can help prevent illness with healthy eating, good sleep, vaccinations, regular exercise, and other steps.    Get the tests that you and your doctor decide on. Depending on your age and risks, examples might include screening for diabetes; hepatitis C; HIV; and cervical, breast, lung, and colon cancer. Screening helps find diseases before any symptoms appear.   Eat healthy foods. Choose fruits, vegetables, whole grains, lean protein, and low-fat dairy foods. Limit saturated fat and reduce salt.     Limit alcohol. Men should have no more than 2 drinks a day. Women should have no more than 1. For some people, no alcohol is the best choice.   Exercise. Get at least 30 minutes of exercise on most days of the week. Walking can be a good choice.     Reach and stay at your healthy weight. This will lower your risk for many health problems.   Take care of your mental health. Try to stay connected with friends, family, and community, and find ways to manage stress.     If you're feeling depressed or hopeless, talk to someone. A counselor can help. If you don't have a counselor, talk to your doctor.   Talk to your doctor if you think you may have a problem with alcohol or drug use. This includes prescription medicines, marijuana, and other drugs.     Avoid tobacco and nicotine: Don't smoke, vape, or chew. If you need help quitting, talk to your doctor.   Practice safer sex. Getting tested, using condoms or dental dams, and limiting sex partners can help prevent STIs.     Use birth control if it's important to you to prevent pregnancy. Talk with your doctor about your choices and what might be best for you.   Prevent problems where you can.

## 2025-04-04 ENCOUNTER — TELEPHONE (OUTPATIENT)
Dept: FAMILY MEDICINE CLINIC | Age: 35
End: 2025-04-04

## 2025-05-06 ENCOUNTER — TELEPHONE (OUTPATIENT)
Dept: FAMILY MEDICINE CLINIC | Age: 35
End: 2025-05-06

## 2025-05-06 NOTE — TELEPHONE ENCOUNTER
Patient  dropped off physician orders paper work.     When forms are completed please call Tiffanie Del Rio for .     Callback # [phone]    Originals given to medical assistant    Scanned in  under   Physicians Orders Paperwork 5.6.25

## 2025-07-03 ENCOUNTER — TELEPHONE (OUTPATIENT)
Dept: FAMILY MEDICINE CLINIC | Age: 35
End: 2025-07-03

## 2025-07-03 NOTE — TELEPHONE ENCOUNTER
Dipti from [facility] wanted to call and let Kalyn know they haven't seen the patient since 2022.    They have tried reaching out to get him back into care.

## 2025-07-08 NOTE — TELEPHONE ENCOUNTER
Please call patients caregiver and let them know he must schedule to be seen at [facility] cardiology